# Patient Record
Sex: FEMALE | Race: WHITE | NOT HISPANIC OR LATINO | Employment: FULL TIME | URBAN - METROPOLITAN AREA
[De-identification: names, ages, dates, MRNs, and addresses within clinical notes are randomized per-mention and may not be internally consistent; named-entity substitution may affect disease eponyms.]

---

## 2017-01-30 RX ORDER — LIRAGLUTIDE 6 MG/ML
INJECTION SUBCUTANEOUS
Qty: 15 ML | Status: SHIPPED | OUTPATIENT
Start: 2017-01-30 | End: 2017-10-27

## 2017-01-30 RX ORDER — GLIMEPIRIDE 2 MG/1
TABLET ORAL
Qty: 30 TABLET | Status: SHIPPED | OUTPATIENT
Start: 2017-01-30

## 2017-06-15 RX ORDER — PEN NEEDLE, DIABETIC 31 GX5/16"
NEEDLE, DISPOSABLE MISCELLANEOUS
Qty: 100 EACH | Refills: 5 | Status: SHIPPED | OUTPATIENT
Start: 2017-06-15

## 2017-06-29 DIAGNOSIS — E78.5 HYPERLIPEMIA: ICD-10-CM

## 2017-06-29 RX ORDER — SIMVASTATIN 20 MG
TABLET ORAL
Qty: 90 TABLET | Refills: 3 | Status: SHIPPED | OUTPATIENT
Start: 2017-06-29

## 2017-08-29 ENCOUNTER — OFFICE VISIT (OUTPATIENT)
Dept: INTERNAL MEDICINE | Facility: CLINIC | Age: 57
End: 2017-08-29

## 2017-08-29 VITALS
OXYGEN SATURATION: 98 % | BODY MASS INDEX: 43.19 KG/M2 | SYSTOLIC BLOOD PRESSURE: 150 MMHG | WEIGHT: 285 LBS | HEIGHT: 68 IN | DIASTOLIC BLOOD PRESSURE: 98 MMHG | HEART RATE: 90 BPM

## 2017-08-29 DIAGNOSIS — E78.5 HYPERLIPIDEMIA, UNSPECIFIED HYPERLIPIDEMIA TYPE: ICD-10-CM

## 2017-08-29 DIAGNOSIS — E03.9 ACQUIRED HYPOTHYROIDISM: ICD-10-CM

## 2017-08-29 DIAGNOSIS — Z11.59 SCREENING FOR VIRAL DISEASE: ICD-10-CM

## 2017-08-29 DIAGNOSIS — Z12.31 ENCOUNTER FOR SCREENING MAMMOGRAM FOR BREAST CANCER: ICD-10-CM

## 2017-08-29 DIAGNOSIS — I10 ESSENTIAL HYPERTENSION, BENIGN: ICD-10-CM

## 2017-08-29 DIAGNOSIS — E11.9 DIABETES MELLITUS WITHOUT COMPLICATION (HCC): Primary | ICD-10-CM

## 2017-08-29 LAB
ALBUMIN SERPL-MCNC: 4.06 G/DL (ref 3.4–4.6)
ALBUMIN/GLOB SERPL: 0.9 G/DL
ALP SERPL-CCNC: 97 U/L (ref 46–116)
ALT SERPL W P-5'-P-CCNC: 75 U/L (ref 14–59)
ANION GAP SERPL CALCULATED.3IONS-SCNC: 11 MMOL/L
AST SERPL-CCNC: 43 U/L (ref 7–37)
BILIRUB SERPL-MCNC: 0.4 MG/DL (ref 0.2–1)
BUN BLD-MCNC: 15 MG/DL (ref 6–22)
BUN/CREAT SERPL: 17.4 (ref 7–25)
CALCIUM SPEC-SCNC: 9.5 MG/DL (ref 8.6–10.5)
CHLORIDE SERPL-SCNC: 100 MMOL/L (ref 95–107)
CHOLEST SERPL-MCNC: 168 MG/DL (ref 0–200)
CO2 SERPL-SCNC: 27 MMOL/L (ref 23–32)
CREAT BLD-MCNC: 0.86 MG/DL (ref 0.55–1.02)
GFR SERPL CREATININE-BSD FRML MDRD: 68 ML/MIN/1.73
GLOBULIN UR ELPH-MCNC: 4.5 GM/DL
GLUCOSE BLD-MCNC: 139 MG/DL (ref 70–100)
HBA1C MFR BLD: 7 % (ref 4–6)
HDLC SERPL-MCNC: 39 MG/DL (ref 40–81)
LDLC SERPL CALC-MCNC: 76 MG/DL (ref 0–100)
LDLC/HDLC SERPL: 1.96 {RATIO}
POTASSIUM BLD-SCNC: 4.5 MMOL/L (ref 3.3–5.3)
PROT SERPL-MCNC: 8.6 G/DL (ref 6.3–8.4)
SODIUM BLD-SCNC: 138 MMOL/L (ref 136–145)
TRIGL SERPL-MCNC: 263 MG/DL (ref 0–150)
VLDLC SERPL-MCNC: 52.6 MG/DL

## 2017-08-29 PROCEDURE — 80053 COMPREHEN METABOLIC PANEL: CPT | Performed by: NURSE PRACTITIONER

## 2017-08-29 PROCEDURE — 80061 LIPID PANEL: CPT | Performed by: NURSE PRACTITIONER

## 2017-08-29 PROCEDURE — 99214 OFFICE O/P EST MOD 30 MIN: CPT | Performed by: NURSE PRACTITIONER

## 2017-08-29 PROCEDURE — 83036 HEMOGLOBIN GLYCOSYLATED A1C: CPT | Performed by: NURSE PRACTITIONER

## 2017-08-29 NOTE — PROGRESS NOTES
Subjective   Shara Abdullahi is a 56 y.o. female.     HPI Comments: She checks her blood sugar once daily (post prandial 100-125; fasting 150). She does not exercise routine. Her last dental exam approximately 3 months ago. She is requesting possible medication change secondary to upcoming insurance change.     Diabetes   She presents for her follow-up diabetic visit. She has type 2 diabetes mellitus. Her disease course has been stable. Pertinent negatives for hypoglycemia include no headaches. Pertinent negatives for diabetes include no blurred vision, no chest pain, no fatigue, no foot paresthesias, no foot ulcerations, no polydipsia, no polyphagia, no polyuria, no visual change, no weakness and no weight loss. Symptoms are stable. Current diabetic treatment includes oral agent (dual therapy). She is compliant with treatment most of the time. Her weight is stable. She is following a generally unhealthy diet. She rarely participates in exercise. There is no change in her home blood glucose trend. An ACE inhibitor/angiotensin II receptor blocker is not being taken. She does not see a podiatrist.Eye exam is current.   Hyperlipidemia   This is a chronic problem. The current episode started more than 1 year ago. The problem is controlled. Recent lipid tests were reviewed and are normal. Exacerbating diseases include hypothyroidism. Pertinent negatives include no chest pain, leg pain, myalgias or shortness of breath. Current antihyperlipidemic treatment includes statins. The current treatment provides significant improvement of lipids. Risk factors for coronary artery disease include hypertension, dyslipidemia, diabetes mellitus, post-menopausal and a sedentary lifestyle.   Hypertension   This is a chronic problem. The current episode started more than 1 year ago. The problem is unchanged. The problem is controlled. Pertinent negatives include no anxiety, blurred vision, chest pain, headaches, orthopnea, palpitations,  peripheral edema, PND or shortness of breath. Risk factors for coronary artery disease include obesity. Past treatments include calcium channel blockers. The current treatment provides significant improvement. Compliance problems include exercise and diet.    Hypothyroidism   This is a chronic problem. The current episode started more than 1 year ago. The problem has been unchanged. Associated symptoms include arthralgias (right ankle, intermittent). Pertinent negatives include no chest pain, coughing, fatigue, fever, headaches, myalgias, visual change or weakness.        The following portions of the patient's history were reviewed and updated as appropriate: allergies, current medications, past family history, past medical history, past social history, past surgical history and problem list.    Review of Systems   Constitutional: Negative for activity change, appetite change, fatigue, fever and weight loss.   Eyes: Negative for blurred vision.   Respiratory: Negative for cough, shortness of breath and wheezing.    Cardiovascular: Negative for chest pain, palpitations, orthopnea, leg swelling and PND.   Endocrine: Negative for polydipsia, polyphagia and polyuria.   Musculoskeletal: Positive for arthralgias (right ankle, intermittent). Negative for myalgias.   Neurological: Negative for weakness and headaches.       Objective   Physical Exam   Constitutional: She is oriented to person, place, and time. She appears well-developed and well-nourished.   HENT:   Head: Normocephalic.   Nose: Nose normal.   Neck: Carotid bruit is not present. Thyroid mass (left thyroid nodule ) present. No thyromegaly present.   Cardiovascular: Regular rhythm and normal heart sounds.  Exam reveals no S3 and no S4.    No murmur heard.  No edema  Repeat bp left arm 122/78   Pulmonary/Chest: Effort normal and breath sounds normal. She has no decreased breath sounds. She has no wheezes. She has no rhonchi. She has no rales.   Musculoskeletal:  She exhibits no edema.    Shara had a diabetic foot exam performed today.   During the foot exam she had a monofilament test performed.    Vascular Status -  Her exam exhibits no right foot edema. Her exam exhibits no left foot edema.   Skin Integrity  -  Her right foot skin is intact.     Shara 's left foot skin is intact. .  Neurological: She is alert and oriented to person, place, and time. Gait normal.   Skin: Skin is warm and dry.   Psychiatric: She has a normal mood and affect.       Assessment/Plan   Shara was seen today for diabetes, hyperlipidemia, hypertension and hypothyroidism.    Diagnoses and all orders for this visit:    Diabetes mellitus without complication  -     Comprehensive Metabolic Panel; Future  -     Hemoglobin A1c; Future  -     Microalbumin / Creatinine Urine Ratio; Future  -     Comprehensive Metabolic Panel  -     Hemoglobin A1c  -     Microalbumin / Creatinine Urine Ratio    Hyperlipidemia, unspecified hyperlipidemia type  -     Lipid Panel; Future  -     Lipid Panel    Essential hypertension, benign  Comments:  goal of bp less than 130/80; low sodium     Acquired hypothyroidism  -     TSH; Future  -     TSH    Screening for viral disease  -     Hepatitis C Antibody; Future  -     Hepatitis C Antibody    Encounter for screening mammogram for breast cancer  -     Mammo Screening Bilateral With CAD; Future    She will return for mammogram and pap.

## 2017-08-30 LAB
CREAT 24H UR-MCNC: 42.3 MG/DL
HCV AB S/CO SERPL IA: <0.1 S/CO RATIO (ref 0–0.9)
MICROALB/CRT. RATIO UR: 13.5 MG/G CREAT (ref 0–30)
MICROALBUMIN UR-MCNC: 5.7 UG/ML
TSH SERPL-ACNC: 0.71 MIU/ML (ref 0.27–4.2)

## 2017-08-31 ENCOUNTER — OFFICE VISIT (OUTPATIENT)
Dept: INTERNAL MEDICINE | Facility: CLINIC | Age: 57
End: 2017-08-31

## 2017-08-31 ENCOUNTER — APPOINTMENT (OUTPATIENT)
Dept: WOMENS IMAGING | Facility: HOSPITAL | Age: 57
End: 2017-08-31

## 2017-08-31 DIAGNOSIS — E03.9 HYPOTHYROIDISM IN ADULT: ICD-10-CM

## 2017-08-31 DIAGNOSIS — Z12.31 ENCOUNTER FOR SCREENING MAMMOGRAM FOR BREAST CANCER: ICD-10-CM

## 2017-08-31 PROCEDURE — 77067 SCR MAMMO BI INCL CAD: CPT | Performed by: RADIOLOGY

## 2017-08-31 PROCEDURE — 77067 SCR MAMMO BI INCL CAD: CPT

## 2017-08-31 RX ORDER — LEVOTHYROXINE SODIUM 0.05 MG/1
TABLET ORAL
Qty: 45 TABLET | Refills: 3 | Status: SHIPPED | OUTPATIENT
Start: 2017-08-31

## 2017-10-25 ENCOUNTER — TELEPHONE (OUTPATIENT)
Dept: INTERNAL MEDICINE | Facility: CLINIC | Age: 57
End: 2017-10-25

## 2017-10-25 NOTE — TELEPHONE ENCOUNTER
----- Message from Ramya Dobson sent at 10/25/2017 10:36 AM EDT -----  Contact: Pt  Pt would like  VICTOZA 18 MG/3ML solution pen-injector      A pill form and generic, because insurance does not cover it anymore    [facility] [place], [place] - [address] AT [address]  - [phone] [facility] [phone] FX

## 2017-10-25 NOTE — TELEPHONE ENCOUNTER
THis med does not come in a pill form. Normally an insurance will have a preferred brand. If they don't have Victoza what other brand is available? ( eg Trulicity ). There are meds called DPP-4inhibitors but they are not a substitute for Victoza.

## 2017-10-27 DIAGNOSIS — E11.9 CONTROLLED TYPE 2 DIABETES MELLITUS WITHOUT COMPLICATION, WITHOUT LONG-TERM CURRENT USE OF INSULIN (HCC): Primary | ICD-10-CM

## 2017-10-27 NOTE — TELEPHONE ENCOUNTER
I have sent over script to replace victoza per her request. Be keeps follow up appointment. Thank s

## 2017-10-27 NOTE — TELEPHONE ENCOUNTER
Pt left voicemail.  She said that is what she would like to do-go back to Janumet XR , would like generic.

## 2017-10-27 NOTE — TELEPHONE ENCOUNTER
Please clarify with patient. Is she wanted to go to back to janumet xr 100/1000 mg instead of victoza? Thanks

## 2017-12-08 ENCOUNTER — TELEPHONE (OUTPATIENT)
Dept: INTERNAL MEDICINE | Facility: CLINIC | Age: 57
End: 2017-12-08

## 2017-12-08 NOTE — TELEPHONE ENCOUNTER
Pt's pharmacy called and left message saying pt has not had medication filled b/c prescriber (Brittany ROMO/Gustavo) is not covered under [employer]/Medicaid.

## 2017-12-08 NOTE — TELEPHONE ENCOUNTER
10/31-Brittany Sent in Sitagliptin-Metformin.  Pharmacy faxed stating medication too expensive.  I spoke to pt that day to check with insurance to see what is less expensive and call us.   12/8/17:Left message with pharmacy to see if pt ever filled Rx .

## 2017-12-08 NOTE — TELEPHONE ENCOUNTER
Please inform patient that we can not send in based on her insurance (medicaid-please verify). We can provide samples if available (please check). She will have to find alternative provide if medicaid. Thanks

## 2019-11-05 ENCOUNTER — TRANSCRIBE ORDERS (OUTPATIENT)
Dept: PHYSICAL THERAPY | Facility: HOSPITAL | Age: 59
End: 2019-11-05

## 2019-11-05 DIAGNOSIS — M25.561 RIGHT KNEE PAIN, UNSPECIFIED CHRONICITY: Primary | ICD-10-CM

## 2019-11-18 ENCOUNTER — HOSPITAL ENCOUNTER (OUTPATIENT)
Dept: PHYSICAL THERAPY | Facility: HOSPITAL | Age: 59
Setting detail: THERAPIES SERIES
Discharge: HOME | End: 2019-11-18

## 2019-11-18 DIAGNOSIS — R29.898 WEAKNESS OF RIGHT LOWER EXTREMITY: ICD-10-CM

## 2019-11-18 DIAGNOSIS — M25.561 ACUTE PAIN OF RIGHT KNEE: Primary | ICD-10-CM

## 2019-11-18 DIAGNOSIS — R26.9 GAIT DIFFICULTY: ICD-10-CM

## 2019-11-18 DIAGNOSIS — M25.661 DECREASED RANGE OF MOTION (ROM) OF RIGHT KNEE: ICD-10-CM

## 2019-11-18 PROCEDURE — 97161 PT EVAL LOW COMPLEX 20 MIN: CPT

## 2019-11-18 PROCEDURE — 97110 THERAPEUTIC EXERCISES: CPT

## 2019-11-18 NOTE — THERAPY EVALUATION
Outpatient Physical Therapy Ortho Initial Evaluation  [facility]     Patient Name: Shara Abdullahi  : 1960  MRN: [id number]  Today's Date: 2019      Visit Date: 2019    Patient Active Problem List   Diagnosis   • Hot flash, menopausal   • Atheroma, skin   • Congested   • Night sweat   • Allergic rhinitis, seasonal   • Skin growth   • Gain of weight   • Osteoarthrosis, unspecified whether generalized or localized, involving lower leg        Past Medical History:   Diagnosis Date   • Essential hypertension, benign    • Hypothyroidism    • Pure hypercholesterolemia    • Thyroid nodule    • Type II diabetes mellitus with complication, uncontrolled (CMS/HCC)         Past Surgical History:   Procedure Laterality Date   • CHOLECYSTECTOMY  2009    lap cholecystectomy   • COLONOSCOPY  2016    Dr Recinos       Visit Dx:     ICD-10-CM ICD-9-CM   1. Acute pain of right knee M25.561 719.46   2. Decreased range of motion (ROM) of right knee M25.661 719.56   3. Weakness of right lower extremity R29.898 729.89   4. Gait difficulty R26.9 781.2         Patient History     Row Name 19 0700             History    Chief Complaint  Pain  -JA      Type of Pain  Knee pain R  -JA      Date Current Problem(s) Began  -- 1 1/2 months  -      Brief Description of Current Complaint  Fell 3 times in a month and a half and R knee was absolutely killing her.  Got an injection  around  and it helped immediately, knee had previously been severely painful.  She is fearful of going down steps.  Likes to go barefoot at home, no issues with that. The last fall was outside and she landed on both knees. 10 yrs ago had shot in same knee.  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 and stays with grandchildren 2 days a week and check on 93 y/o  mom  -JA      Are you or can you be pregnant  No  -JA         Pain     Pain Location  Knee  -JA      Pain at Present  1  -JA      Pain at Best  1  -JA      Pain at Worst  8  -JA         Fall Risk Assessment    Any falls in the past year:  Yes  -JA      Number of falls reported in the last 12 months  3  -JA      Factors that contributed to the fall:  Lost balance;Tripped;Other (comment)  -JA         Services    Prior Rehab/Home Health Experiences  No  -JA         Daily Activities    Primary Language  English  -JA      How does patient learn best?  Listening  -JA      Teaching needs identified  Home Exercise Program;Management of Condition  -JA      Barriers to learning  None  -JA      Recommended Referrals  Physical Therapy  -JA      Pt Participated in POC and Goals  Yes  -JA         Safety    Are you being hurt, hit, or frightened by anyone at home or in your life?  No  -JA      Are you being neglected by a caregiver  No  -JA        User Key  (r) = Recorded By, (t) = Taken By, (c) = Cosigned By    Initials Name Provider Type    Monet Zavala, PT Physical Therapist          PT Ortho     Row Name 11/18/19 0700       Special Tests/Palpation    Special Tests/Palpation  Knee  -JA       Knee Palpation    Knee Palpation?  -- TTP medial jt line, patella medial and distal  -JA       General ROM    RT Lower Ext  Comment  -JA    LT Lower Ext  Comment  -JA       Right Lower Ext    RT Lower Extremity Comments  AROM ; PROM   -JA       Left Lower Ext    LT Lower Extremity Comments  AROM ; PROM  -JA       MMT (Manual Muscle Testing)    Rt Lower Ext  Rt Hip Flexion;Rt Hip ABduction;Rt Hip Extension;Rt Knee Extension;Rt Knee Flexion;Rt Ankle Plantarflexion;Rt Ankle Dorsiflexion  -JA    Lt Lower Ext  Lt Hip Flexion;Lt Hip ABduction;Lt Knee Extension;Lt Knee Flexion;Lt Ankle Plantarflexion;Lt Ankle Dorsiflexion;Lt Hip Extension  -JA       MMT Right Lower Ext    Rt Hip Flexion MMT, Gross Movement  (4-/5) good  minus  -JA    Rt Hip Extension MMT, Gross Movement  (3+/5) fair plus  -JA    Rt Hip ABduction MMT, Gross Movement  (3/5) fair  -JA    Rt Knee Extension MMT, Gross Movement  (3+/5) fair plus  -JA    Rt Knee Flexion MMT, Gross Movement  (3+/5) fair plus  -JA    Rt Ankle Plantarflexion MMT, Gross Movement  (4-/5) good minus  -JA    Rt Ankle Dorsiflexion MMT, Gross Movement  (4-/5) good minus  -JA       MMT Left Lower Ext    Lt Hip Flexion MMT, Gross Movement  (4+/5) good plus  -JA    Lt Hip Extension MMT, Gross Movement  (4/5) good  -JA    Lt Hip ABduction MMT, Gross Movement  (3+/5) fair plus  -JA    Lt Knee Extension MMT, Gross Movement  (4/5) good  -JA    Lt Knee Flexion MMT, Gross Movement  (4/5) good  -JA    Lt Ankle Plantarflexion MMT, Gross Movement  (4+/5) good plus  -JA    Lt Ankle Dorsiflexion MMT, Gross Movement  (4+/5) good plus  -JA       Sensation    Sensation WNL?  WNL  -JA      User Key  (r) = Recorded By, (t) = Taken By, (c) = Cosigned By    Initials Name Provider Type    Monet Zavala, PT Physical Therapist                      Therapy Education  Education Details: Emphasized the importance of holding each exercises the specified amount for full benefit.  Discussed importance of full ROm and strength to reduce strain/pain on knee.   Given: HEP, Symptoms/condition management, Pain management  Program: New  How Provided: Verbal, Demonstration, Written  Provided to: Patient  Level of Understanding: Teach back education performed     PT OP Goals     Row Name 11/18/19 1500          PT Short Term Goals    STG Date to Achieve  12/02/19  -VIANCA     STG 1  Patient will be independent and compliant with initial HEP   -VIANCA     STG 1 Progress  New  -VIANCA     STG 2  Pt will demonstrate good quad contraction without recruitment of gluteals, hamstrings, or gastroc.  -VIANCA     STG 2 Progress  New  -VIANCA     STG 3  Pt will have demonstrate 15 degrees active extension in sitting.  -VIANCA     STG 3 Progress  New  -VIANCA         Long Term Goals    LTG Date to Achieve  12/18/19  -VIANCA     LTG 1  Pt will be independent with advanced HEP for knee/Lower extremity/core strengthening/stabilization.  -     LTG 1 Progress  New  -JA     LTG 2  Pt will demonstrate heel strike to toe off gait  -JA     LTG 2 Progress  New  -JA     LTG 3  Pt will be able to ascend/descend stairs with normal reciprocal gait using 1 handrail.  -JA     LTG 3 Progress  New  -JA     LTG 4  Pt will score 73% or better on the KOS indicating decrease in perceived functional disability.  -VIANCA     LTG 4 Progress  New  -        Time Calculation    PT Goal Re-Cert Due Date  02/18/20  -VIANCA       User Key  (r) = Recorded By, (t) = Taken By, (c) = Cosigned By    Initials Name Provider Type    Monet Zavala, PT Physical Therapist          PT Assessment/Plan     Row Name 11/18/19 1500          PT Assessment    Functional Limitations  Impaired gait;Limitation in home management;Limitations in community activities;Limitations in functional capacity and performance;Performance in leisure activities;Performance in self-care ADL  -     Impairments  Pain;Range of motion;Muscle strength;Gait;Endurance;Balance;Posture;Poor body mechanics  -     Assessment Comments  Shara Abdullahi is a 59 y.o. female referred to outpatient physical therapy for evaluation and treatment of right knee pain.  Patient presents with decreased R knee ROM, weakness in R LE, and gait deficits which impairs her functional mobility. Signs and symptoms are consistent with referring diagnosis.  Pertinent comorbidities and personal factors that may affect progress include, but are not limited to, OA.  This condition is stable. Recommend skilled PT to address functional deficits. Thank you for this referral.  -VIANCA     Please refer to paper survey for additional self-reported information  Yes  -     Rehab Potential  Good  -     Patient/caregiver participated in establishment of treatment plan and goals  Yes   "-JA     Patient would benefit from skilled therapy intervention  Yes  -VIANCA        PT Plan    PT Frequency  1x/week  -VIANCA     Predicted Duration of Therapy Intervention (Therapy Eval)  4 weeks  -VIANCA     Planned CPT's?  PT EVAL LOW COMPLEXITY: 30332;PT THER PROC EA 15 MIN: 88829;PT MANUAL THERAPY EA 15 MIN: 98966;PT THER ACT EA 15 MIN: 44119;PT NEUROMUSC RE-EDUCATION EA 15 MIN: 24905;PT GAIT TRAINING EA 15 MIN: 33094;PT HOT OR COLD PACK TREAT MCARE;PT SELF CARE/HOME MGMT/TRAIN EA 15: 67829  -VIANCA     PT Plan Comments  assess response to initial HEP, educate pt in use of home seated stepper use, progress strengthening (resisted hip abd, hip add, SAQ?)  -VIANCA       User Key  (r) = Recorded By, (t) = Taken By, (c) = Cosigned By    Initials Name Provider Type    Monet Zavala, PT Physical Therapist            OP Exercises     Row Name 11/18/19 0700             Subjective Comments    Subjective Comments  initial eval  -JA         Total Minutes    74141 - PT Therapeutic Exercise Minutes  15  -JA         Exercise 1    Exercise Name 1  next visit begin with NUstep  -JA      Additional Comments  educate for use at home, pt's  has a seated stepper  -JA         Exercise 2    Exercise Name 2  LAQ  -JA      Cueing 2  Verbal;Tactile;Demo  -JA      Reps 2  10  -JA      Time 2  5sec  -JA         Exercise 3    Exercise Name 3  QS  -JA      Cueing 3  Verbal;Tactile  -JA      Reps 3  10  -JA      Time 3  5sec  -JA         Exercise 4    Exercise Name 4  Glute sets  -JA      Cueing 4  Verbal;Tactile  -JA      Reps 4  10  -JA      Time 4  5 sec  -JA         Exercise 5    Exercise Name 5  Heel Slides  -JA      Cueing 5  Verbal;Tactile  -JA      Reps 5  5  -JA      Time 5  5sec  -JA      Additional Comments  can keep opposite knee bent  -JA         Exercise 6    Exercise Name 6  Supine Hip ABD \"windshield wiper\"  -JA      Cueing 6  Verbal;Tactile  -JA      Reps 6  10  -JA      Additional Comments  toes pointed to ceiling, dont let " rotate out  -JA         Exercise 7    Exercise Name 7  piriformis stretch  -JA      Reps 7  3  -JA      Time 7  20sec  -JA      Additional Comments  taught in sitting and HL  -JA         Exercise 8    Exercise Name 8  Seated hamstring stretch  -JA      Cueing 8  Verbal;Tactile;Demo  -JA      Reps 8  3  -JA      Time 8  20sec  -JA        User Key  (r) = Recorded By, (t) = Taken By, (c) = Cosigned By    Initials Name Provider Type    Monet Zavala, PT Physical Therapist                                  Time Calculation:     Start Time: 0700  Stop Time: 0745  Time Calculation (min): 45 min     Therapy Charges for Today     Code Description Service Date Service Provider Modifiers Qty    04777565090 HC PT THER PROC EA 15 MIN 11/18/2019 Monet Hinds, PT GP 1    [id number]  PT EVAL LOW COMPLEXITY 2 11/18/2019 Monet Hinds, PT GP 1                    Monet Hinds, PT  11/18/2019

## 2019-11-29 ENCOUNTER — HOSPITAL ENCOUNTER (OUTPATIENT)
Dept: PHYSICAL THERAPY | Facility: HOSPITAL | Age: 59
Setting detail: THERAPIES SERIES
Discharge: HOME | End: 2019-11-29

## 2019-11-29 DIAGNOSIS — R29.898 WEAKNESS OF RIGHT LOWER EXTREMITY: ICD-10-CM

## 2019-11-29 DIAGNOSIS — M25.561 ACUTE PAIN OF RIGHT KNEE: Primary | ICD-10-CM

## 2019-11-29 DIAGNOSIS — M25.661 DECREASED RANGE OF MOTION (ROM) OF RIGHT KNEE: ICD-10-CM

## 2019-11-29 DIAGNOSIS — R26.9 GAIT DIFFICULTY: ICD-10-CM

## 2019-11-29 PROCEDURE — 97110 THERAPEUTIC EXERCISES: CPT

## 2019-11-29 NOTE — THERAPY TREATMENT NOTE
Outpatient Physical Therapy Ortho Treatment Note  [facility]     Patient Name: Shara Abdullahi  : 1960  MRN: [id number]  Today's Date: 2019      Visit Date: 2019    Visit Dx:    ICD-10-CM ICD-9-CM   1. Acute pain of right knee M25.561 719.46   2. Decreased range of motion (ROM) of right knee M25.661 719.56   3. Weakness of right lower extremity R29.898 729.89   4. Gait difficulty R26.9 781.2       Patient Active Problem List   Diagnosis   • Hot flash, menopausal   • Atheroma, skin   • Congested   • Night sweat   • Allergic rhinitis, seasonal   • Skin growth   • Gain of weight   • Osteoarthrosis, unspecified whether generalized or localized, involving lower leg        Past Medical History:   Diagnosis Date   • Essential hypertension, benign    • Hypothyroidism    • Pure hypercholesterolemia    • Thyroid nodule    • Type II diabetes mellitus with complication, uncontrolled (CMS/HCC)         Past Surgical History:   Procedure Laterality Date   • CHOLECYSTECTOMY  2009    lap cholecystectomy   • COLONOSCOPY  2016    Dr Recinos                       PT Assessment/Plan     Row Name 19          PT Assessment    Assessment Comments  Pain well controlled. Lacking full extension right kne. Add heel prop. Add weight and increase reps  -WS       User Key  (r) = Recorded By, (t) = Taken By, (c) = Cosigned By    Initials Name Provider Type    Noé Figueroa PTA Physical Therapy Assistant          Modalities     Row Name 19             Ice    Patient denies application of Ice  Yes  -WS      Patient reports will apply ice at home to involved area  Yes  -WS        User Key  (r) = Recorded By, (t) = Taken By, (c) = Cosigned By    Initials Name Provider Type    Noé Figueroa PTA Physical Therapy Assistant        OP Exercises     Row Name 19             Subjective Comments    Subjective Comments  stood alot yesterday  -WS         Subjective Pain     "Able to rate subjective pain?  yes  -WS      Pre-Treatment Pain Level  2  -WS         Total Minutes    29851 - PT Therapeutic Exercise Minutes  25  -WS         Exercise 1    Exercise Name 1  Nustep LE L#  -WS      Time 1  5 min  -WS         Exercise 2    Exercise Name 2  LAQ  -WS      Cueing 2  Verbal;Tactile;Demo  -WS      Reps 2  10  -WS      Time 2  5sec  -WS      Additional Comments  next increase reps add weight  -WS         Exercise 3    Exercise Name 3  QS  -WS      Cueing 3  Verbal;Tactile  -WS      Reps 3  10  -WS      Time 3  5sec  -WS      Additional Comments  increase reps  -WS         Exercise 4    Exercise Name 4  Glute sets  -WS      Cueing 4  Verbal;Tactile  -WS      Reps 4  10  -WS      Time 4  5 sec  -WS      Additional Comments  increase reps  -WS         Exercise 5    Exercise Name 5  Heel Slides  -WS      Cueing 5  Verbal;Tactile  -WS      Reps 5  5  -WS      Time 5  5sec  -WS      Additional Comments  opp knee bent  -WS         Exercise 6    Exercise Name 6  Supine Hip ABD \"windshield wiper\"  -WS      Cueing 6  Verbal;Tactile  -WS      Reps 6  10  -WS      Additional Comments  toes up  -WS         Exercise 7    Exercise Name 7  piriformis stretch  -WS      Reps 7  3  -WS      Time 7  20sec  -WS      Additional Comments  sitting  -WS         Exercise 8    Exercise Name 8  Seated hamstring stretch  -WS      Cueing 8  Verbal;Tactile;Demo  -WS      Reps 8  3  -WS      Time 8  20sec  -WS         Exercise 9    Exercise Name 9  calf raise  -WS      Reps 9  10  -WS      Additional Comments  increase reps  -WS         Exercise 10    Exercise Name 10  standing HS curl R  -WS      Reps 10  10  -WS      Additional Comments  increase reps add weight  -WS         Exercise 11    Exercise Name 11  add heel prop  -WS        User Key  (r) = Recorded By, (t) = Taken By, (c) = Cosigned By    Initials Name Provider Type    Noé Figueroa PTA Physical Therapy Assistant                       PT OP Goals     " Row Name 11/29/19 0900          PT Short Term Goals    STG Date to Achieve  12/02/19  -WS     STG 1  Patient will be independent and compliant with initial HEP   -WS     STG 1 Progress  Ongoing  -WS     STG 2  Pt will demonstrate good quad contraction without recruitment of gluteals, hamstrings, or gastroc.  -WS     STG 2 Progress  Ongoing  -WS     STG 3  Pt will have demonstrate 15 degrees active extension in sitting.  -WS     STG 3 Progress  Ongoing  -WS        Long Term Goals    LTG Date to Achieve  12/18/19  -WS     LTG 1  Pt will be independent with advanced HEP for knee/Lower extremity/core strengthening/stabilization.  -WS     LTG 1 Progress  New  -WS     LTG 2  Pt will demonstrate heel strike to toe off gait  -WS     LTG 2 Progress  New  -WS     LTG 3  Pt will be able to ascend/descend stairs with normal reciprocal gait using 1 handrail.  -WS     LTG 3 Progress  New  -WS     LTG 4  Pt will score 73% or better on the KOS indicating decrease in perceived functional disability.  -WS     LTG 4 Progress  New  -       User Key  (r) = Recorded By, (t) = Taken By, (c) = Cosigned By    Initials Name Provider Type    Noé Figueroa PTA Physical Therapy Assistant          Therapy Education  Given: HEP, Pain management, Edema management  Program: Reinforced  How Provided: Verbal  Provided to: Patient              Time Calculation:   Start Time: 0905  Stop Time: 0930  Time Calculation (min): 25 min  Therapy Charges for Today     Code Description Service Date Service Provider Modifiers Qty    04405144769 HC PT THER PROC EA 15 MIN 11/29/2019 Noé Padilla PTA GP 2                    Noé Padilla PTA  11/29/2019

## 2019-12-02 ENCOUNTER — HOSPITAL ENCOUNTER (OUTPATIENT)
Dept: PHYSICAL THERAPY | Facility: HOSPITAL | Age: 59
Setting detail: THERAPIES SERIES
Discharge: HOME | End: 2019-12-02

## 2019-12-02 DIAGNOSIS — M25.561 ACUTE PAIN OF RIGHT KNEE: Primary | ICD-10-CM

## 2019-12-02 DIAGNOSIS — R29.898 WEAKNESS OF RIGHT LOWER EXTREMITY: ICD-10-CM

## 2019-12-02 DIAGNOSIS — M25.661 DECREASED RANGE OF MOTION (ROM) OF RIGHT KNEE: ICD-10-CM

## 2019-12-02 DIAGNOSIS — R26.9 GAIT DIFFICULTY: ICD-10-CM

## 2019-12-02 PROCEDURE — 97110 THERAPEUTIC EXERCISES: CPT

## 2019-12-02 NOTE — THERAPY TREATMENT NOTE
Outpatient Physical Therapy Ortho Treatment Note  [facility]     Patient Name: Shara Abdullahi  : 1960  MRN: [id number]  Today's Date: 2019      Visit Date: 2019    Visit Dx:    ICD-10-CM ICD-9-CM   1. Acute pain of right knee M25.561 719.46   2. Decreased range of motion (ROM) of right knee M25.661 719.56   3. Weakness of right lower extremity R29.898 729.89   4. Gait difficulty R26.9 781.2       Patient Active Problem List   Diagnosis   • Hot flash, menopausal   • Atheroma, skin   • Congested   • Night sweat   • Allergic rhinitis, seasonal   • Skin growth   • Gain of weight   • Osteoarthrosis, unspecified whether generalized or localized, involving lower leg        Past Medical History:   Diagnosis Date   • Essential hypertension, benign    • Hypothyroidism    • Pure hypercholesterolemia    • Thyroid nodule    • Type II diabetes mellitus with complication, uncontrolled (CMS/HCC)         Past Surgical History:   Procedure Laterality Date   • CHOLECYSTECTOMY  2009    lap cholecystectomy   • COLONOSCOPY  2016    Dr Recinos                       PT Assessment/Plan     Row Name 19 1127          PT Assessment    Assessment Comments  Ms. Abdullahi continues with stiff R knee, along with weakness of R quads/hamstrings, but is progressing well with exercises.  Also noted is that she reports increased pain in L knee and buttock today.  -LP     Please refer to paper survey for additional self-reported information  Yes  -LP     Rehab Potential  Excellent  -LP     Patient/caregiver participated in establishment of treatment plan and goals  Yes  -LP     Patient would benefit from skilled therapy intervention  Yes  -LP        PT Plan    PT Frequency  2x/week  -LP     Predicted Duration of Therapy Intervention (Therapy Eval)  4 weeks  -LP     PT Plan Comments  Contniue to progress strengtheing and AROM as able  -LP       User Key  (r) = Recorded By, (t) = Taken By, (c) = Cosigned By     Initials Name Provider Type    LP Evie Meza, PT Physical Therapist          Modalities     Row Name 12/02/19 1100             Ice    Patient reports will apply ice at home to involved area  Yes  -LP        User Key  (r) = Recorded By, (t) = Taken By, (c) = Cosigned By    Initials Name Provider Type    LP Evie Meza, PT Physical Therapist        OP Exercises     Row Name 12/02/19 1000             Subjective Pain    Able to rate subjective pain?  yes  -LP      Pre-Treatment Pain Level  1  -LP      Subjective Pain Comment  3/10 pain for her L hip(sciatic)  -LP         Total Minutes    95174 - PT Therapeutic Exercise Minutes  40  -LP         Exercise 1    Exercise Name 1  Nustep LE   -LP      Time 1  5 min  -LP      Additional Comments  L5  -LP         Exercise 2    Reps 2  10  -LP      Time 2  5  -LP         Exercise 3    Exercise Name 3  QS  -LP      Sets 3  2  -LP      Reps 3  10  -LP         Exercise 4    Exercise Name 4  Glute sets  -LP         Exercise 5    Exercise Name 5  Heel Slides  -LP         Exercise 6    Exercise Name 6  HL hip abd with RTB  -LP      Reps 6  10  -LP      Additional Comments  +10 singles  -LP         Exercise 7    Exercise Name 7  piriformis stretch  -LP      Reps 7  3  -LP      Time 7  20  -LP      Additional Comments  in supine(felt more comfort)  -LP         Exercise 8    Exercise Name 8  supine Hamstring  -LP      Reps 8  3  -LP      Time 8  20 sec  -LP         Exercise 9    Exercise Name 9  calf raise  -LP      Reps 9  15  -LP         Exercise 10    Exercise Name 10  standing HS curl R  -LP      Reps 10  15  -LP      Additional Comments  added 2#  -LP         Exercise 11    Exercise Name 11  heel prop  -LP      Time 11  1.5 min  -LP        User Key  (r) = Recorded By, (t) = Taken By, (c) = Cosigned By    Initials Name Provider Type    LP Evie Meza, PT Physical Therapist                       PT OP Goals     Row Name 12/02/19 1100          PT Short Term Goals     STG 1  Patient will be independent and compliant with initial HEP   -LP     STG 1 Progress  Progressing  -LP     STG 1 Progress Comments  still needs cuing for some ex  -LP     STG 2  Pt will demonstrate good quad contraction without recruitment of gluteals, hamstrings, or gastroc.  -LP     STG 2 Progress  Ongoing  -LP     STG 3  Pt will have demonstrate 15 degrees active extension in sitting.  -LP     STG 3 Progress  Ongoing  -LP        Long Term Goals    LTG 1  Pt will be independent with advanced HEP for knee/Lower extremity/core strengthening/stabilization.  -LP     LTG 1 Progress  Ongoing  -LP     LTG 2  Pt will demonstrate heel strike to toe off gait  -LP     LTG 2 Progress  Ongoing  -LP     LTG 3  Pt will be able to ascend/descend stairs with normal reciprocal gait using 1 handrail.  -LP     LTG 3 Progress  Ongoing  -LP     LTG 4  Pt will score 73% or better on the KOS indicating decrease in perceived functional disability.  -LP     LTG 4 Progress  Ongoing  -LP       User Key  (r) = Recorded By, (t) = Taken By, (c) = Cosigned By    Initials Name Provider Type    LP Evie Meza, PT Physical Therapist          Therapy Education  Education Details: stretching techniques  Given: HEP, Symptoms/condition management  Program: Reinforced  How Provided: Verbal, Demonstration  Provided to: Patient  Level of Understanding: Teach back education performed              Time Calculation:   Start Time: 1045  Stop Time: 1130  Time Calculation (min): 45 min  Therapy Charges for Today     Code Description Service Date Service Provider Modifiers Qty    92954367553 HC PT THER PROC EA 15 MIN 12/2/2019 Evie Meza PT GP 3                    Evie Meza PT  12/2/2019

## 2019-12-13 ENCOUNTER — HOSPITAL ENCOUNTER (OUTPATIENT)
Dept: PHYSICAL THERAPY | Facility: HOSPITAL | Age: 59
Setting detail: THERAPIES SERIES
Discharge: HOME | End: 2019-12-13

## 2019-12-13 DIAGNOSIS — R29.898 WEAKNESS OF RIGHT LOWER EXTREMITY: ICD-10-CM

## 2019-12-13 DIAGNOSIS — M25.561 ACUTE PAIN OF RIGHT KNEE: Primary | ICD-10-CM

## 2019-12-13 DIAGNOSIS — M25.661 DECREASED RANGE OF MOTION (ROM) OF RIGHT KNEE: ICD-10-CM

## 2019-12-13 DIAGNOSIS — R26.9 GAIT DIFFICULTY: ICD-10-CM

## 2019-12-13 PROCEDURE — 97110 THERAPEUTIC EXERCISES: CPT

## 2019-12-13 NOTE — THERAPY TREATMENT NOTE
Outpatient Physical Therapy Ortho Treatment Note  [facility]     Patient Name: Shara Abdullahi  : 1960  MRN: [id number]  Today's Date: 2019      Visit Date: 2019    Visit Dx:    ICD-10-CM ICD-9-CM   1. Acute pain of right knee M25.561 719.46   2. Decreased range of motion (ROM) of right knee M25.661 719.56   3. Weakness of right lower extremity R29.898 729.89   4. Gait difficulty R26.9 781.2       Patient Active Problem List   Diagnosis   • Hot flash, menopausal   • Atheroma, skin   • Congested   • Night sweat   • Allergic rhinitis, seasonal   • Skin growth   • Gain of weight   • Osteoarthrosis, unspecified whether generalized or localized, involving lower leg        Past Medical History:   Diagnosis Date   • Essential hypertension, benign    • Hypothyroidism    • Pure hypercholesterolemia    • Thyroid nodule    • Type II diabetes mellitus with complication, uncontrolled (CMS/HCC)         Past Surgical History:   Procedure Laterality Date   • CHOLECYSTECTOMY  2009    lap cholecystectomy   • COLONOSCOPY  2016    Dr Recinos                       PT Assessment/Plan     Row Name 19 0924          PT Assessment    Assessment Comments  Right knee feeling better per patient. Added 3# to LAQ. Added standing hip abd on right. next visit add left as well  -WS       User Key  (r) = Recorded By, (t) = Taken By, (c) = Cosigned By    Initials Name Provider Type    Noé Figueroa PTA Physical Therapy Assistant            OP Exercises     Row Name 19 0900             Subjective Comments    Subjective Comments  pain right kne. left hip pain also  -WS         Subjective Pain    Able to rate subjective pain?  yes  -WS      Pre-Treatment Pain Level  2  -WS         Total Minutes    73500 - PT Therapeutic Exercise Minutes  40  -WS         Exercise 1    Exercise Name 1  Nustep LE   -WS      Time 1  5 min  -WS      Additional Comments  L5  -WS         Exercise 2    Exercise Name 2   LAQ  -WS      Reps 2  10  -WS      Time 2  5  -WS      Additional Comments  3#  -WS         Exercise 3    Exercise Name 3  QS  -WS      Sets 3  2  -WS      Reps 3  10  -WS         Exercise 4    Exercise Name 4  Glute sets  -WS         Exercise 5    Exercise Name 5  Heel Slides  -WS         Exercise 6    Exercise Name 6  HL hip abd with RTB  -WS      Reps 6  10  -WS      Additional Comments  B/uni  -WS         Exercise 7    Exercise Name 7  piriformis stretch  -WS      Reps 7  3  -WS      Time 7  20  -WS         Exercise 8    Exercise Name 8  supine Hamstring  -WS      Reps 8  3  -WS      Time 8  20 sec  -WS         Exercise 9    Exercise Name 9  calf raise  -WS      Reps 9  15  -WS         Exercise 10    Exercise Name 10  standing HS curl R  -WS      Reps 10  15  -WS      Additional Comments  2#  -WS         Exercise 11    Exercise Name 11  heel prop  -WS      Time 11  1.5 min  -WS         Exercise 12    Exercise Name 12  standing hip abd  -WS      Reps 12  15  -WS      Additional Comments  2#  -WS         Exercise 13    Exercise Name 13  hip add ball  -WS      Reps 13  15  -WS        User Key  (r) = Recorded By, (t) = Taken By, (c) = Cosigned By    Initials Name Provider Type    WS Noé Padilla PTA Physical Therapy Assistant                       PT OP Goals     Row Name 12/13/19 0900          PT Short Term Goals    STG 1  Patient will be independent and compliant with initial HEP   -WS     STG 1 Progress  Progressing  -WS     STG 2  Pt will demonstrate good quad contraction without recruitment of gluteals, hamstrings, or gastroc.  -WS     STG 2 Progress  Ongoing  -WS     STG 3  Pt will have demonstrate 15 degrees active extension in sitting.  -WS     STG 3 Progress  Ongoing  -WS        Long Term Goals    LTG 1  Pt will be independent with advanced HEP for knee/Lower extremity/core strengthening/stabilization.  -WS     LTG 1 Progress  Ongoing  -WS     LTG 2  Pt will demonstrate heel strike to toe off gait   -     LTG 2 Progress  Ongoing  -     LTG 3  Pt will be able to ascend/descend stairs with normal reciprocal gait using 1 handrail.  -     LTG 3 Progress  Ongoing  -     LTG 4  Pt will score 73% or better on the KOS indicating decrease in perceived functional disability.  -     LTG 4 Progress  Ongoing  -       User Key  (r) = Recorded By, (t) = Taken By, (c) = Cosigned By    Initials Name Provider Type    Noé Figueroa PTA Physical Therapy Assistant          Therapy Education  Given: HEP, Symptoms/condition management, Pain management, Posture/body mechanics  Program: Reinforced  How Provided: Verbal              Time Calculation:   Start Time: 0900  Stop Time: 0940  Time Calculation (min): 40 min  Therapy Charges for Today     Code Description Service Date Service Provider Modifiers Qty    23475986692 HC PT THER PROC EA 15 MIN 12/13/2019 Noé Padilla PTA GP 3                    Noé Padilla PTA  12/13/2019

## 2019-12-27 ENCOUNTER — HOSPITAL ENCOUNTER (OUTPATIENT)
Dept: PHYSICAL THERAPY | Facility: HOSPITAL | Age: 59
Setting detail: THERAPIES SERIES
Discharge: HOME | End: 2019-12-27

## 2019-12-27 DIAGNOSIS — M25.661 DECREASED RANGE OF MOTION (ROM) OF RIGHT KNEE: ICD-10-CM

## 2019-12-27 DIAGNOSIS — R29.898 WEAKNESS OF RIGHT LOWER EXTREMITY: ICD-10-CM

## 2019-12-27 DIAGNOSIS — R26.9 GAIT DIFFICULTY: ICD-10-CM

## 2019-12-27 DIAGNOSIS — M25.561 ACUTE PAIN OF RIGHT KNEE: Primary | ICD-10-CM

## 2019-12-27 PROCEDURE — 97110 THERAPEUTIC EXERCISES: CPT

## 2019-12-27 NOTE — THERAPY TREATMENT NOTE
Outpatient Physical Therapy Ortho Treatment Note  [facility]     Patient Name: Shara Abdullahi  : 1960  MRN: [id number]  Today's Date: 2019      Visit Date: 2019    Visit Dx:    ICD-10-CM ICD-9-CM   1. Acute pain of right knee M25.561 719.46   2. Decreased range of motion (ROM) of right knee M25.661 719.56   3. Weakness of right lower extremity R29.898 729.89   4. Gait difficulty R26.9 781.2       Patient Active Problem List   Diagnosis   • Hot flash, menopausal   • Atheroma, skin   • Congested   • Night sweat   • Allergic rhinitis, seasonal   • Skin growth   • Gain of weight   • Osteoarthrosis, unspecified whether generalized or localized, involving lower leg        Past Medical History:   Diagnosis Date   • Essential hypertension, benign    • Hypothyroidism    • Pure hypercholesterolemia    • Thyroid nodule    • Type II diabetes mellitus with complication, uncontrolled (CMS/HCC)         Past Surgical History:   Procedure Laterality Date   • CHOLECYSTECTOMY  2009    lap cholecystectomy   • COLONOSCOPY  2016    Dr Recinos                       PT Assessment/Plan     Row Name 19 1141          PT Assessment    Assessment Comments  Progresed weights. Patient getting relief from ice at home. All exercises perfromed tao  -WS       User Key  (r) = Recorded By, (t) = Taken By, (c) = Cosigned By    Initials Name Provider Type    Noé Figueroa PTA Physical Therapy Assistant          Modalities     Row Name 19 1110             Ice    Ice Applied  Yes  -WS      Location  left hip, tao knee  -WS      Rx Minutes  10 mins  -WS      Ice S/P Rx  Yes  -WS        User Key  (r) = Recorded By, (t) = Taken By, (c) = Cosigned By    Initials Name Provider Type    Noé Figueroa PTA Physical Therapy Assistant        OP Exercises     Row Name 19 1110             Subjective Pain    Able to rate subjective pain?  yes  -WS      Subjective Pain Comment  knee pain 1/10, left  hip 4/10  -WS         Total Minutes    14208 - PT Therapeutic Exercise Minutes  40  -WS         Exercise 1    Exercise Name 1  Nustep LE   -WS      Time 1  5 min  -WS      Additional Comments  L5  -WS         Exercise 2    Exercise Name 2  LAQ  -WS      Reps 2  20  -WS      Time 2  5  -WS      Additional Comments  4#  -WS         Exercise 3    Exercise Name 3  QS  -WS      Sets 3  2  -WS      Reps 3  10  -WS         Exercise 4    Exercise Name 4  Glute sets  -WS      Reps 4  15  -WS         Exercise 5    Exercise Name 5  Heel Slides  -WS         Exercise 6    Exercise Name 6  HL hip abd with RTB  -WS      Reps 6  10  -WS      Additional Comments  B/uni  -WS         Exercise 7    Exercise Name 7  piriformis stretch  -WS      Reps 7  3  -WS      Time 7  20  -WS         Exercise 8    Exercise Name 8  supine Hamstring  -WS      Reps 8  3  -WS      Time 8  20 sec  -WS         Exercise 9    Exercise Name 9  calf raise  -WS      Reps 9  15  -WS         Exercise 10    Exercise Name 10  standing HS curl R  -WS      Reps 10  15  -WS      Additional Comments  3#  -WS         Exercise 11    Exercise Name 11  heel prop  -WS      Time 11  1.5 min  -WS         Exercise 12    Exercise Name 12  standing hip abd  -WS      Reps 12  15  -WS      Additional Comments  3#  -WS         Exercise 13    Exercise Name 13  hip add ball  -WS      Reps 13  15  -WS        User Key  (r) = Recorded By, (t) = Taken By, (c) = Cosigned By    Initials Name Provider Type    Noé Figueroa PTA Physical Therapy Assistant                       PT OP Goals     Row Name 12/27/19 1100          PT Short Term Goals    STG 1  Patient will be independent and compliant with initial HEP   -WS     STG 1 Progress  Progressing  -WS     STG 2  Pt will demonstrate good quad contraction without recruitment of gluteals, hamstrings, or gastroc.  -WS     STG 2 Progress  Ongoing  -WS     STG 3  Pt will have demonstrate 15 degrees active extension in sitting.  -WS      STG 3 Progress  Ongoing  -WS        Long Term Goals    LTG 1  Pt will be independent with advanced HEP for knee/Lower extremity/core strengthening/stabilization.  -WS     LTG 1 Progress  Ongoing  -WS     LTG 2  Pt will demonstrate heel strike to toe off gait  -WS     LTG 2 Progress  Ongoing  -WS     LTG 3  Pt will be able to ascend/descend stairs with normal reciprocal gait using 1 handrail.  -WS     LTG 3 Progress  Ongoing  -WS     LTG 4  Pt will score 73% or better on the KOS indicating decrease in perceived functional disability.  -WS     LTG 4 Progress  Ongoing  -WS       User Key  (r) = Recorded By, (t) = Taken By, (c) = Cosigned By    Initials Name Provider Type    Noé Figueroa PTA Physical Therapy Assistant          Therapy Education  Given: HEP, Symptoms/condition management, Pain management, Posture/body mechanics, Edema management  Program: Reinforced  How Provided: Verbal  Provided to: Patient              Time Calculation:   Start Time: 1110  Stop Time: 1200  Time Calculation (min): 50 min  Therapy Charges for Today     Code Description Service Date Service Provider Modifiers Qty    69501974300 HC PT THER PROC EA 15 MIN 12/27/2019 Noé Padilla PTA GP 3    [id number] HC PT HOT OR COLD PACK TREAT MCARE 12/27/2019 Noé Padilla PTA GP 1                    Noé Padilla PTA  12/27/2019

## 2021-03-22 ENCOUNTER — BULK ORDERING (OUTPATIENT)
Dept: CASE MANAGEMENT | Facility: OTHER | Age: 61
End: 2021-03-22

## 2021-03-22 DIAGNOSIS — Z23 IMMUNIZATION DUE: ICD-10-CM

## 2022-08-17 ENCOUNTER — LAB REQUISITION (OUTPATIENT)
Dept: LAB | Facility: HOSPITAL | Age: 62
End: 2022-08-17

## 2022-08-17 DIAGNOSIS — Z00.00 ENCOUNTER FOR GENERAL ADULT MEDICAL EXAMINATION WITHOUT ABNORMAL FINDINGS: ICD-10-CM

## 2022-08-17 PROCEDURE — 88304 TISSUE EXAM BY PATHOLOGIST: CPT | Performed by: PODIATRIST

## 2022-08-18 LAB
LAB AP CASE REPORT: NORMAL
LAB AP CLINICAL INFORMATION: NORMAL
PATH REPORT.FINAL DX SPEC: NORMAL
PATH REPORT.GROSS SPEC: NORMAL

## 2023-04-03 ENCOUNTER — OFFICE VISIT (OUTPATIENT)
Dept: ENDOCRINOLOGY | Age: 63
End: 2023-04-03
Payer: MEDICAID

## 2023-04-03 VITALS
WEIGHT: 270.4 LBS | DIASTOLIC BLOOD PRESSURE: 70 MMHG | OXYGEN SATURATION: 100 % | BODY MASS INDEX: 40.98 KG/M2 | HEIGHT: 68 IN | TEMPERATURE: 97.8 F | SYSTOLIC BLOOD PRESSURE: 126 MMHG | HEART RATE: 71 BPM

## 2023-04-03 DIAGNOSIS — E66.9 OBESITY WITH SERIOUS COMORBIDITY, UNSPECIFIED CLASSIFICATION, UNSPECIFIED OBESITY TYPE: ICD-10-CM

## 2023-04-03 DIAGNOSIS — E03.9 HYPOTHYROIDISM, UNSPECIFIED TYPE: ICD-10-CM

## 2023-04-03 DIAGNOSIS — E04.2 MULTIPLE THYROID NODULES: Primary | ICD-10-CM

## 2023-04-03 PROCEDURE — 99244 OFF/OP CNSLTJ NEW/EST MOD 40: CPT | Performed by: INTERNAL MEDICINE

## 2023-04-03 PROCEDURE — 1160F RVW MEDS BY RX/DR IN RCRD: CPT | Performed by: INTERNAL MEDICINE

## 2023-04-03 PROCEDURE — 1159F MED LIST DOCD IN RCRD: CPT | Performed by: INTERNAL MEDICINE

## 2023-04-03 RX ORDER — ATORVASTATIN CALCIUM 80 MG/1
1 TABLET, FILM COATED ORAL DAILY
COMMUNITY
Start: 2023-03-20

## 2023-04-03 RX ORDER — MULTIPLE VITAMINS W/ MINERALS TAB 9MG-400MCG
1 TAB ORAL DAILY
COMMUNITY

## 2023-04-03 RX ORDER — CHLORAL HYDRATE 500 MG
1 CAPSULE ORAL
COMMUNITY

## 2023-04-03 RX ORDER — AMITRIPTYLINE HYDROCHLORIDE 25 MG/1
TABLET, FILM COATED ORAL
COMMUNITY
Start: 2023-03-21

## 2023-04-03 RX ORDER — CHOLECALCIFEROL (VITAMIN D3) 125 MCG
1 TABLET ORAL DAILY
COMMUNITY
Start: 2023-02-09

## 2023-04-03 NOTE — H&P (VIEW-ONLY)
Referring provider: CLARISSA Marc     Reason for consult:  Thyroid nodules/goiter    HPI:   - 62 year old female here for evaluation of a thyroid nodules/goiter  - She had a CT of her chest that showed left thyroid enlargement with tracheal deviation in 3/2023  - Denies dysphagia, dysphonia, dyspnea  - Denies history of radiation to the head/neck  - No known family history of thyroid cancer  - She has been on levothyroxine 25 mcg daily and had a normal TSH in 11/2022  - She complains of weight gain  - She also has diabetes mellitus type 2 on Amaryl 2 mg bid    The following portions of the patient's history were reviewed and updated as appropriate: allergies, current medications, past family history, past medical history, past social history, past surgical history and problem list.    Review of Systems   Constitutional: Positive for unexpected weight gain.   HENT: Negative.    Psychiatric/Behavioral: Negative.        Objective     Vitals:    04/03/23 0851   BP: 126/70   Pulse: 71   Temp: 97.8 °F (36.6 °C)   SpO2: 100%        Physical Exam  Constitutional:       Appearance: Normal appearance. She is obese.   HENT:      Head: Normocephalic and atraumatic.   Eyes:      General: No scleral icterus.     Conjunctiva/sclera: Conjunctivae normal.   Neck:      Thyroid: Thyromegaly present.      Comments: Left lobe 4-5 times normal size, right lobe 2 times normal size  Pulmonary:      Effort: Pulmonary effort is normal. No respiratory distress.   Neurological:      Mental Status: She is alert.      Cranial Nerves: No cranial nerve deficit.      Gait: Gait normal.   Psychiatric:         Mood and Affect: Mood normal.         Behavior: Behavior normal.         Thought Content: Thought content normal.         Judgment: Judgment normal.       Labs/Imaging:  - She had a CT of her chest that showed left thyroid enlargement with tracheal deviation in 3/2023    Assessment & Plan   1. Thyroid nodules/goiter  - Will order US  of the thyroid and determine further management after reviewing  - She does not have any obstruction symptoms at this time    2. Hypothyroidism  - She is on levothyroxine 25 mcg daily with a normal TSH in 11/2022    3. Obesity  - Discussed different ways to reduce calories to induce weight loss    - Follow-up appt. To be determined after reviewing thyroid US

## 2023-04-17 ENCOUNTER — HOSPITAL ENCOUNTER (OUTPATIENT)
Dept: ULTRASOUND IMAGING | Facility: HOSPITAL | Age: 63
Discharge: HOME | End: 2023-04-17
Admitting: INTERNAL MEDICINE
Payer: MEDICAID

## 2023-04-17 DIAGNOSIS — E04.2 MULTIPLE THYROID NODULES: Primary | ICD-10-CM

## 2023-04-17 DIAGNOSIS — E04.2 MULTIPLE THYROID NODULES: ICD-10-CM

## 2023-04-17 PROCEDURE — 76536 US EXAM OF HEAD AND NECK: CPT

## 2023-04-22 NOTE — PROGRESS NOTES
05/02/23 0001   Pre-Procedure Phone Call   Procedure Time Verified Yes   Arrival Time 1230   Procedure Location Verified Yes   Medical History Reviewed No   NPO Status Reinforced Yes   Ride and Caregiver Arranged Yes   Patient Knows to Bring Current Medications   (No changes in medications since last reviewed.)   Bring Outside Films Requested   (US Thyroid 4/17/23 in PACS)

## 2023-05-02 ENCOUNTER — HOSPITAL ENCOUNTER (OUTPATIENT)
Dept: ULTRASOUND IMAGING | Facility: HOSPITAL | Age: 63
Discharge: HOME | End: 2023-05-02
Payer: MEDICAID

## 2023-05-02 VITALS
DIASTOLIC BLOOD PRESSURE: 85 MMHG | BODY MASS INDEX: 41.07 KG/M2 | TEMPERATURE: 98 F | SYSTOLIC BLOOD PRESSURE: 133 MMHG | OXYGEN SATURATION: 97 % | HEIGHT: 68 IN | WEIGHT: 271 LBS | HEART RATE: 74 BPM | RESPIRATION RATE: 18 BRPM

## 2023-05-02 DIAGNOSIS — E04.2 MULTIPLE THYROID NODULES: ICD-10-CM

## 2023-05-02 PROCEDURE — 0 LIDOCAINE 1 % SOLUTION: Performed by: RADIOLOGY

## 2023-05-02 PROCEDURE — 88305 TISSUE EXAM BY PATHOLOGIST: CPT | Performed by: INTERNAL MEDICINE

## 2023-05-02 PROCEDURE — 88173 CYTOPATH EVAL FNA REPORT: CPT | Performed by: INTERNAL MEDICINE

## 2023-05-02 RX ORDER — LIDOCAINE HYDROCHLORIDE 10 MG/ML
10 INJECTION, SOLUTION INFILTRATION; PERINEURAL ONCE
Status: COMPLETED | OUTPATIENT
Start: 2023-05-02 | End: 2023-05-02

## 2023-05-02 RX ORDER — SODIUM CHLORIDE 0.9 % (FLUSH) 0.9 %
10 SYRINGE (ML) INJECTION AS NEEDED
OUTPATIENT
Start: 2023-05-02

## 2023-05-02 RX ORDER — ASPIRIN 81 MG/1
81 TABLET, CHEWABLE ORAL DAILY
COMMUNITY

## 2023-05-02 RX ADMIN — LIDOCAINE HYDROCHLORIDE 4 ML: 10 INJECTION, SOLUTION INFILTRATION; PERINEURAL at 14:57

## 2023-05-02 NOTE — NURSING NOTE
Pt D/Jonah. Pt ambulated self out to go home with .Pt's , Mahesh,  Ambulated with pt out to  to drive pt home.

## 2023-05-02 NOTE — DISCHARGE INSTRUCTIONS
EDUCATION / DISCHARGE INSTRUCTIONS  Aspiration:  An aspiration is a procedure used to take a sample of cells or tissue from a lump, mass or other area of concern.   Within a week the radiologist will send a report to your physician.  A pathologist will also examine the tissue and send a report.  THIS EDUCATION INFORMATION WAS REVIEWED PRIOR TO THE PROCEDURE AND CONSENT.  Patient initials_________________Time_____1257________      Post Procedure:    *  Rest today (no pushing pulling or straining).   *  Slowly increase activity tomorow.    *  If you received sedation do not drive for 24 hours.   *  Keep dressing clean and dry.   *  Leave dressing on puncture site for 24 hours.    *  You may shower when dressing removed.   *  If needed, use an ice pack at the site for up to 20 minutes at a time for pain.    Call your doctor if experiencing:   *  Signs of infection such as redness, swelling, excessive pain and / or foul      smelling drainage from the puncture site.   *  Chills or fever over 101 degrees (by mouth).   *  Unrelieved pain.   *  Any new or severe symptoms.      Following the procedure:     Follow-up with the ordering physician as directed.    Continue to take other medications as directed by your physician unless  otherwise instructed.   If applicable, resume taking your blood thinners or Aspirin on __5/3/23_________.     If you have any concerns please call the Radiology Nurses Desk at [phone].  You are the most important factor in your recovery.  Follow the above instructions carefully.

## 2023-05-03 ENCOUNTER — TELEPHONE (OUTPATIENT)
Dept: INTERVENTIONAL RADIOLOGY/VASCULAR | Facility: HOSPITAL | Age: 63
End: 2023-05-03
Payer: MEDICAID

## 2023-05-03 DIAGNOSIS — E04.2 MULTIPLE THYROID NODULES: Primary | ICD-10-CM

## 2023-05-03 LAB
CYTO UR: NORMAL
LAB AP CASE REPORT: NORMAL
LAB AP NON-GYN SPECIMEN ADEQUACY: NORMAL
PATH REPORT.FINAL DX SPEC: NORMAL
PATH REPORT.GROSS SPEC: NORMAL

## 2024-05-03 ENCOUNTER — HOSPITAL ENCOUNTER (OUTPATIENT)
Facility: HOSPITAL | Age: 64
Discharge: HOME | End: 2024-05-03
Admitting: INTERNAL MEDICINE
Payer: COMMERCIAL

## 2024-05-03 DIAGNOSIS — E04.2 MULTIPLE THYROID NODULES: ICD-10-CM

## 2024-05-03 PROCEDURE — 76536 US EXAM OF HEAD AND NECK: CPT

## 2024-06-04 ENCOUNTER — TELEPHONE (OUTPATIENT)
Dept: ENDOCRINOLOGY | Age: 64
End: 2024-06-04
Payer: COMMERCIAL

## 2024-06-04 NOTE — TELEPHONE ENCOUNTER
Patient called stating Thyroid nodules got bigger from the scan she had gotten done on 5/3, let her know we have not received any results yet, did not see them in her chart  but she wasn't for sure what she needed to do from here.

## 2024-06-05 NOTE — TELEPHONE ENCOUNTER
I can review her ultrasound images with her and can discuss everything at her upcoming appointment.  Nothing urgent to do right now.

## 2024-07-10 ENCOUNTER — OFFICE VISIT (OUTPATIENT)
Dept: ENDOCRINOLOGY | Age: 64
End: 2024-07-10
Payer: COMMERCIAL

## 2024-07-10 VITALS
DIASTOLIC BLOOD PRESSURE: 80 MMHG | OXYGEN SATURATION: 97 % | HEART RATE: 92 BPM | BODY MASS INDEX: 39.5 KG/M2 | WEIGHT: 260.6 LBS | SYSTOLIC BLOOD PRESSURE: 124 MMHG | HEIGHT: 68 IN

## 2024-07-10 DIAGNOSIS — E66.9 OBESITY WITH SERIOUS COMORBIDITY, UNSPECIFIED CLASSIFICATION, UNSPECIFIED OBESITY TYPE: ICD-10-CM

## 2024-07-10 DIAGNOSIS — E03.9 HYPOTHYROIDISM, UNSPECIFIED TYPE: ICD-10-CM

## 2024-07-10 DIAGNOSIS — E04.2 MULTIPLE THYROID NODULES: Primary | ICD-10-CM

## 2024-07-10 PROCEDURE — 99214 OFFICE O/P EST MOD 30 MIN: CPT | Performed by: INTERNAL MEDICINE

## 2024-07-10 RX ORDER — IBUPROFEN 800 MG/1
800 TABLET ORAL
COMMUNITY

## 2024-07-10 RX ORDER — EZETIMIBE 10 MG/1
10 TABLET ORAL DAILY
COMMUNITY

## 2024-07-10 NOTE — PROGRESS NOTES
Chief complaint/Reason for consult: thyroid nodules/goiter    HPI:   - 63 year old female here for evaluation of a thyroid nodules/goiter  - Last seen in 4/2023  - She had a CT of her chest that showed left thyroid enlargement with tracheal deviation in 3/2023  - Denies dysphagia, dysphonia, dyspnea  - Denies history of radiation to the head/neck  - No known family history of thyroid cancer  - She has been on levothyroxine 25 mcg daily and had a normal TSH recently per patient  - She had an FNA in 2023 with benign cytology      The following portions of the patient's history were reviewed and updated as appropriate: allergies, current medications, past family history, past medical history, past social history, past surgical history, and problem list.      Objective     Vitals:    07/10/24 1526   BP: 124/80   Pulse: 92   SpO2: 97%        Physical Exam  Vitals reviewed.   Constitutional:       Appearance: Normal appearance.   HENT:      Head: Normocephalic and atraumatic.   Eyes:      General: No scleral icterus.  Pulmonary:      Effort: Pulmonary effort is normal. No respiratory distress.   Neurological:      Mental Status: She is alert.      Gait: Gait normal.   Psychiatric:         Mood and Affect: Mood normal.         Behavior: Behavior normal.         Thought Content: Thought content normal.         Judgment: Judgment normal.         Assessment & Plan   1. Thyroid nodules/goiter  - I personally reviewed her thyroid ultrasound and it looks stable (there were some differences in the way the nodules were measured compared to 2023) and she had a FNA with benign cytology in 2024  - She does not have any obstruction symptoms at this time  - Will repeat ultrasound in 1 year     2. Hypothyroidism  - She is on levothyroxine 25 mcg daily with a normal TSH that was normal recently per patient     3. Obesity  - Discussed different ways to reduce calories to induce weight loss    - Return to clinic in 1 year

## 2025-06-23 ENCOUNTER — HOSPITAL ENCOUNTER (OUTPATIENT)
Facility: HOSPITAL | Age: 65
Discharge: HOME | End: 2025-06-23
Admitting: INTERNAL MEDICINE
Payer: COMMERCIAL

## 2025-06-23 DIAGNOSIS — E04.2 MULTIPLE THYROID NODULES: ICD-10-CM

## 2025-06-23 PROCEDURE — 76536 US EXAM OF HEAD AND NECK: CPT

## 2025-06-26 ENCOUNTER — TELEPHONE (OUTPATIENT)
Dept: ENDOCRINOLOGY | Age: 65
End: 2025-06-26
Payer: COMMERCIAL

## 2025-06-26 NOTE — TELEPHONE ENCOUNTER
Patient is going to call the my chart help desk to get set up. Will call us back to schedule once she has it activated

## 2025-06-26 NOTE — TELEPHONE ENCOUNTER
Patient is going to set up my chart. They are asking if we can make their visit a my chart video visit once she is set up

## 2025-07-17 ENCOUNTER — TELEMEDICINE (OUTPATIENT)
Dept: ENDOCRINOLOGY | Age: 65
End: 2025-07-17
Payer: COMMERCIAL

## 2025-07-17 ENCOUNTER — PATIENT OUTREACH (OUTPATIENT)
Dept: ENDOCRINOLOGY | Age: 65
End: 2025-07-17

## 2025-07-17 DIAGNOSIS — E04.2 MULTIPLE THYROID NODULES: Primary | ICD-10-CM

## 2025-07-17 PROCEDURE — 99213 OFFICE O/P EST LOW 20 MIN: CPT | Performed by: INTERNAL MEDICINE

## 2025-07-17 NOTE — PROGRESS NOTES
Chief complaint/Reason for consult: thyroid nodules    HPI:   - 64 year old female here for evaluation of a thyroid nodules/goiter  - Last seen in 7/2024  - She had a CT of her chest that showed left thyroid enlargement with tracheal deviation in 3/2023  - Denies dysphagia, dysphonia, dyspnea  - Denies history of radiation to the head/neck  - No known family history of thyroid cancer  - She has been on levothyroxine 12.5 mcg daily  - She had an FNA in 2023 with benign cytology       The following portions of the patient's history were reviewed and updated as appropriate: allergies, current medications, past family history, past medical history, past social history, past surgical history, and problem list.      Objective     There were no vitals filed for this visit.     Physical Exam  Constitutional:       Appearance: Normal appearance.   Eyes:      General: No scleral icterus.  Neurological:      Mental Status: She is alert.   Psychiatric:         Mood and Affect: Mood normal.         Behavior: Behavior normal.         Thought Content: Thought content normal.         Judgment: Judgment normal.         Assessment & Plan   1. Thyroid nodules/goiter  - I personally reviewed her thyroid ultrasound and it looks stable (there were some differences in the way the nodules were measured compared to 2023) and she had a FNA with benign cytology in 2023  - She does not have any obstruction symptoms at this time  - Will repeat ultrasound in 1 year  - Would stop levothyroxine given low TSH       - Return to clinic in 1 year